# Patient Record
Sex: FEMALE | Race: WHITE | Employment: FULL TIME | URBAN - METROPOLITAN AREA
[De-identification: names, ages, dates, MRNs, and addresses within clinical notes are randomized per-mention and may not be internally consistent; named-entity substitution may affect disease eponyms.]

---

## 2023-07-19 ENCOUNTER — HOSPITAL ENCOUNTER (OUTPATIENT)
Age: 58
Discharge: HOME | End: 2023-07-19
Payer: COMMERCIAL

## 2023-07-19 ENCOUNTER — APPOINTMENT (OUTPATIENT)
Dept: GENERAL RADIOLOGY | Age: 58
End: 2023-07-19
Attending: NURSE PRACTITIONER
Payer: COMMERCIAL

## 2023-07-19 VITALS
DIASTOLIC BLOOD PRESSURE: 93 MMHG | HEIGHT: 59 IN | HEART RATE: 96 BPM | WEIGHT: 169 LBS | RESPIRATION RATE: 20 BRPM | BODY MASS INDEX: 34.07 KG/M2 | TEMPERATURE: 98 F | OXYGEN SATURATION: 96 % | SYSTOLIC BLOOD PRESSURE: 145 MMHG

## 2023-07-19 DIAGNOSIS — S60.00XA FINGER CONTUSION: ICD-10-CM

## 2023-07-19 DIAGNOSIS — S63.635A SPRAIN OF INTERPHALANGEAL JOINT OF LEFT RING FINGER, INITIAL ENCOUNTER: Primary | ICD-10-CM

## 2023-07-19 PROCEDURE — 73140 X-RAY EXAM OF FINGER(S): CPT | Performed by: NURSE PRACTITIONER

## 2023-07-19 PROCEDURE — 99203 OFFICE O/P NEW LOW 30 MIN: CPT | Performed by: NURSE PRACTITIONER

## 2023-07-19 PROCEDURE — A4570 SPLINT: HCPCS | Performed by: NURSE PRACTITIONER

## 2023-07-19 NOTE — ED INITIAL ASSESSMENT (HPI)
Pt c/o pain , swelling and injury to her 4th finger of her left hand. Pt states as she trying to catch a falling  pitcher of tea when the injury occurred. Pt states her finger was dislocated and she thinks she put it back in place.

## 2024-02-12 ENCOUNTER — APPOINTMENT (OUTPATIENT)
Dept: ULTRASOUND IMAGING | Age: 59
End: 2024-02-12
Attending: EMERGENCY MEDICINE

## 2024-02-12 ENCOUNTER — HOSPITAL ENCOUNTER (EMERGENCY)
Age: 59
Discharge: HOME | End: 2024-02-12
Attending: EMERGENCY MEDICINE

## 2024-02-12 ENCOUNTER — APPOINTMENT (OUTPATIENT)
Dept: GENERAL RADIOLOGY | Age: 59
End: 2024-02-12
Attending: EMERGENCY MEDICINE

## 2024-02-12 VITALS
BODY MASS INDEX: 32.89 KG/M2 | DIASTOLIC BLOOD PRESSURE: 84 MMHG | HEIGHT: 59 IN | WEIGHT: 163.14 LBS | OXYGEN SATURATION: 97 % | SYSTOLIC BLOOD PRESSURE: 133 MMHG | RESPIRATION RATE: 15 BRPM | HEART RATE: 74 BPM

## 2024-02-12 DIAGNOSIS — M25.562 ACUTE PAIN OF LEFT KNEE: Primary | ICD-10-CM

## 2024-02-12 PROCEDURE — 73562 X-RAY EXAM OF KNEE 3: CPT

## 2024-02-12 PROCEDURE — 93971 EXTREMITY STUDY: CPT

## 2024-02-12 PROCEDURE — 99283 EMERGENCY DEPT VISIT LOW MDM: CPT

## 2024-02-12 ASSESSMENT — ENCOUNTER SYMPTOMS
NUMBNESS: 0
WEAKNESS: 0
SHORTNESS OF BREATH: 0
WOUND: 0
CHILLS: 0
FEVER: 0